# Patient Record
Sex: MALE | Race: WHITE | NOT HISPANIC OR LATINO | Employment: OTHER | URBAN - METROPOLITAN AREA
[De-identification: names, ages, dates, MRNs, and addresses within clinical notes are randomized per-mention and may not be internally consistent; named-entity substitution may affect disease eponyms.]

---

## 2024-06-10 ENCOUNTER — HOSPITAL ENCOUNTER (EMERGENCY)
Facility: CLINIC | Age: 64
Discharge: HOME | End: 2024-06-10
Attending: EMERGENCY MEDICINE | Admitting: EMERGENCY MEDICINE
Payer: COMMERCIAL

## 2024-06-10 ENCOUNTER — APPOINTMENT (OUTPATIENT)
Dept: ULTRASOUND IMAGING | Facility: CLINIC | Age: 64
End: 2024-06-10
Attending: EMERGENCY MEDICINE
Payer: COMMERCIAL

## 2024-06-10 VITALS
TEMPERATURE: 97.1 F | SYSTOLIC BLOOD PRESSURE: 141 MMHG | RESPIRATION RATE: 16 BRPM | DIASTOLIC BLOOD PRESSURE: 96 MMHG | OXYGEN SATURATION: 94 % | WEIGHT: 220 LBS | HEART RATE: 84 BPM

## 2024-06-10 DIAGNOSIS — R82.90 ABNORMAL URINALYSIS: ICD-10-CM

## 2024-06-10 DIAGNOSIS — R93.89 ABNORMAL COMPUTED TOMOGRAPHY ANGIOGRAPHY (CTA): ICD-10-CM

## 2024-06-10 DIAGNOSIS — I26.94 MULTIPLE SUBSEGMENTAL PULMONARY EMBOLI WITHOUT ACUTE COR PULMONALE (H): ICD-10-CM

## 2024-06-10 LAB
ALBUMIN UR-MCNC: 30 MG/DL
ANION GAP SERPL CALCULATED.3IONS-SCNC: 14 MMOL/L (ref 7–15)
APPEARANCE UR: ABNORMAL
BASOPHILS # BLD AUTO: 0.1 10E3/UL (ref 0–0.2)
BASOPHILS NFR BLD AUTO: 1 %
BILIRUB UR QL STRIP: NEGATIVE
BUN SERPL-MCNC: 7.2 MG/DL (ref 8–23)
CALCIUM SERPL-MCNC: 9 MG/DL (ref 8.8–10.2)
CHLORIDE SERPL-SCNC: 101 MMOL/L (ref 98–107)
COLOR UR AUTO: ABNORMAL
CREAT SERPL-MCNC: 0.89 MG/DL (ref 0.67–1.17)
DEPRECATED HCO3 PLAS-SCNC: 25 MMOL/L (ref 22–29)
EGFRCR SERPLBLD CKD-EPI 2021: >90 ML/MIN/1.73M2
EOSINOPHIL # BLD AUTO: 0.2 10E3/UL (ref 0–0.7)
EOSINOPHIL NFR BLD AUTO: 4 %
ERYTHROCYTE [DISTWIDTH] IN BLOOD BY AUTOMATED COUNT: 14.4 % (ref 10–15)
GLUCOSE SERPL-MCNC: 98 MG/DL (ref 70–99)
GLUCOSE UR STRIP-MCNC: NEGATIVE MG/DL
HCT VFR BLD AUTO: 46.1 % (ref 40–53)
HGB BLD-MCNC: 15.7 G/DL (ref 13.3–17.7)
HGB UR QL STRIP: NEGATIVE
HOLD SPECIMEN: NORMAL
IMM GRANULOCYTES # BLD: 0 10E3/UL
IMM GRANULOCYTES NFR BLD: 0 %
KETONES UR STRIP-MCNC: 5 MG/DL
LEUKOCYTE ESTERASE UR QL STRIP: ABNORMAL
LYMPHOCYTES # BLD AUTO: 1.3 10E3/UL (ref 0.8–5.3)
LYMPHOCYTES NFR BLD AUTO: 24 %
MCH RBC QN AUTO: 33.6 PG (ref 26.5–33)
MCHC RBC AUTO-ENTMCNC: 34.1 G/DL (ref 31.5–36.5)
MCV RBC AUTO: 99 FL (ref 78–100)
MONOCYTES # BLD AUTO: 0.6 10E3/UL (ref 0–1.3)
MONOCYTES NFR BLD AUTO: 11 %
MUCOUS THREADS #/AREA URNS LPF: PRESENT /LPF
NEUTROPHILS # BLD AUTO: 3.2 10E3/UL (ref 1.6–8.3)
NEUTROPHILS NFR BLD AUTO: 60 %
NITRATE UR QL: NEGATIVE
NRBC # BLD AUTO: 0 10E3/UL
NRBC BLD AUTO-RTO: 0 /100
NT-PROBNP SERPL-MCNC: 413 PG/ML (ref 0–900)
PH UR STRIP: 5 [PH] (ref 5–7)
PLATELET # BLD AUTO: 248 10E3/UL (ref 150–450)
POTASSIUM SERPL-SCNC: 3.9 MMOL/L (ref 3.4–5.3)
RBC # BLD AUTO: 4.67 10E6/UL (ref 4.4–5.9)
RBC URINE: 2 /HPF
SODIUM SERPL-SCNC: 140 MMOL/L (ref 135–145)
SP GR UR STRIP: 1.02 (ref 1–1.03)
SPERM #/AREA URNS HPF: PRESENT /HPF
SQUAMOUS EPITHELIAL: 4 /HPF
TROPONIN T SERPL HS-MCNC: <6 NG/L
UROBILINOGEN UR STRIP-MCNC: NORMAL MG/DL
WBC # BLD AUTO: 5.3 10E3/UL (ref 4–11)
WBC URINE: 28 /HPF

## 2024-06-10 PROCEDURE — 93005 ELECTROCARDIOGRAM TRACING: CPT | Performed by: EMERGENCY MEDICINE

## 2024-06-10 PROCEDURE — 83880 ASSAY OF NATRIURETIC PEPTIDE: CPT | Performed by: EMERGENCY MEDICINE

## 2024-06-10 PROCEDURE — 36415 COLL VENOUS BLD VENIPUNCTURE: CPT | Performed by: EMERGENCY MEDICINE

## 2024-06-10 PROCEDURE — 93010 ELECTROCARDIOGRAM REPORT: CPT | Performed by: EMERGENCY MEDICINE

## 2024-06-10 PROCEDURE — 82565 ASSAY OF CREATININE: CPT | Performed by: EMERGENCY MEDICINE

## 2024-06-10 PROCEDURE — 84484 ASSAY OF TROPONIN QUANT: CPT | Performed by: EMERGENCY MEDICINE

## 2024-06-10 PROCEDURE — 93970 EXTREMITY STUDY: CPT

## 2024-06-10 PROCEDURE — 99285 EMERGENCY DEPT VISIT HI MDM: CPT | Performed by: EMERGENCY MEDICINE

## 2024-06-10 PROCEDURE — 87186 SC STD MICRODIL/AGAR DIL: CPT | Performed by: EMERGENCY MEDICINE

## 2024-06-10 PROCEDURE — 81001 URINALYSIS AUTO W/SCOPE: CPT | Performed by: EMERGENCY MEDICINE

## 2024-06-10 PROCEDURE — 82374 ASSAY BLOOD CARBON DIOXIDE: CPT | Performed by: EMERGENCY MEDICINE

## 2024-06-10 PROCEDURE — 85041 AUTOMATED RBC COUNT: CPT | Performed by: EMERGENCY MEDICINE

## 2024-06-10 PROCEDURE — 87086 URINE CULTURE/COLONY COUNT: CPT | Performed by: EMERGENCY MEDICINE

## 2024-06-10 PROCEDURE — 99285 EMERGENCY DEPT VISIT HI MDM: CPT | Mod: 25 | Performed by: EMERGENCY MEDICINE

## 2024-06-10 RX ORDER — ALIROCUMAB 150 MG/ML
150 INJECTION, SOLUTION SUBCUTANEOUS
COMMUNITY
Start: 2024-05-16

## 2024-06-10 RX ORDER — CEPHALEXIN 500 MG/1
500 CAPSULE ORAL 2 TIMES DAILY
Qty: 10 CAPSULE | Refills: 0 | Status: SHIPPED | OUTPATIENT
Start: 2024-06-10 | End: 2024-06-12

## 2024-06-10 RX ORDER — BUDESONIDE AND FORMOTEROL FUMARATE DIHYDRATE 160; 4.5 UG/1; UG/1
2 AEROSOL RESPIRATORY (INHALATION) 2 TIMES DAILY
COMMUNITY

## 2024-06-10 RX ORDER — METOPROLOL SUCCINATE 100 MG/1
1 TABLET, EXTENDED RELEASE ORAL DAILY
COMMUNITY
Start: 2024-05-15

## 2024-06-10 RX ORDER — POLYVINYL ALCOHOL 14 MG/ML
1 SOLUTION/ DROPS OPHTHALMIC DAILY PRN
COMMUNITY
Start: 2022-07-18

## 2024-06-10 RX ORDER — PANTOPRAZOLE SODIUM 40 MG/1
40 TABLET, DELAYED RELEASE ORAL
COMMUNITY

## 2024-06-10 RX ORDER — ALBUTEROL SULFATE 90 UG/1
2 AEROSOL, METERED RESPIRATORY (INHALATION) EVERY 6 HOURS PRN
COMMUNITY

## 2024-06-10 ASSESSMENT — ACTIVITIES OF DAILY LIVING (ADL)
ADLS_ACUITY_SCORE: 35

## 2024-06-10 ASSESSMENT — ENCOUNTER SYMPTOMS
MUSCULOSKELETAL NEGATIVE: 1
HEMATOLOGIC/LYMPHATIC NEGATIVE: 1
ENDOCRINE NEGATIVE: 1
GASTROINTESTINAL NEGATIVE: 1
CHEST TIGHTNESS: 0
PSYCHIATRIC NEGATIVE: 1
EYES NEGATIVE: 1
CONSTITUTIONAL NEGATIVE: 1
SHORTNESS OF BREATH: 1
NEUROLOGICAL NEGATIVE: 1
ALLERGIC/IMMUNOLOGIC NEGATIVE: 1

## 2024-06-10 ASSESSMENT — COLUMBIA-SUICIDE SEVERITY RATING SCALE - C-SSRS
2. HAVE YOU ACTUALLY HAD ANY THOUGHTS OF KILLING YOURSELF IN THE PAST MONTH?: NO
6. HAVE YOU EVER DONE ANYTHING, STARTED TO DO ANYTHING, OR PREPARED TO DO ANYTHING TO END YOUR LIFE?: NO
1. IN THE PAST MONTH, HAVE YOU WISHED YOU WERE DEAD OR WISHED YOU COULD GO TO SLEEP AND NOT WAKE UP?: NO

## 2024-06-10 NOTE — DISCHARGE INSTRUCTIONS
1) Your evaluation today based on your history and report and reviewing CT imaging that was completed 3 days earlier.  You are currently starting on a blood thinner which is anticoagulation based on CT interpretation ordered by her treating cardiologist-days ago that revealed nonocclusive pulmonary emboli bilaterally in the right upper lobe and  right lower lobe and in the left lower lobe.  We discussed risk and benefit of anticoagulation including bleeding.  I started you on a starter pack which is Xarelto to take 1 tablet twice a day for the next 21 days and then 1 tablet daily for the next 3 months with further medication adjustment to be done by your treating provider including your- Dr.S Larkin-treating cardiologist who ordered your CT imaging and your primary care provider- Dr. LINDA Dickens.    2) During visit urinalysis was obtained which appears to be concerning for infection with moderate leukocyte esterase and 20 white cells.  He reported no true urinary symptoms although urine appeared cloudy.  To ensure his symptoms are due to an infection urine culture is pending he was started empirically on antibiotics pending urine culture results.  We discussed that if there is a need to change treatment plan based on urine culture results will be notified by the ED follow-up nurses.  We have also discussed the importance of reaching out to her care team upon return to [facility] however you should call them while you are here in [place]

## 2024-06-10 NOTE — MEDICATION SCRIBE - ADMISSION MEDICATION HISTORY
Medication Scribe Admission Medication History    Admission medication history is complete. The information provided in this note is only as accurate as the sources available at the time of the update.    Information Source(s): Patient and CareEverywhere/SureScripts via  with patient in room and finished at desk .    Pertinent Information: He has his medicines with him today.  All medicines on PTA med list are new to the list as patient is from [place].    Changes made to PTA medication list:  Added: All medicines  Deleted: None  Changed: None    Allergies reviewed with patient and updates made in EHR: yes.    Medication History Completed By: Hayley Robledo 6/10/2024 12:54 PM    PTA Med List   Medication Sig Last Dose    albuterol (PROAIR HFA/PROVENTIL HFA/VENTOLIN HFA) 108 (90 Base) MCG/ACT inhaler Inhale 2 puffs into the lungs every 6 hours as needed for shortness of breath More than a month at prn    budesonide-formoterol (SYMBICORT) 160-4.5 MCG/ACT Inhaler Inhale 2 puffs into the lungs 2 times daily 6/10/2024 at am has with today    metoprolol succinate ER (TOPROL XL) 100 MG 24 hr tablet Take 1 tablet by mouth daily 6/9/2024 at am    pantoprazole (PROTONIX) 40 MG EC tablet 40 mg 2 times daily (before meals) 6/9/2024 at pm    polyvinyl alcohol (LIQUIFILM TEARS) 1.4 % ophthalmic solution Apply 1 drop to eye daily as needed for dry eyes Past Month at prn    PRALUENT 150 MG/ML injectable pen Inject 150 mg Subcutaneous every 14 days 6/1/2024 at am

## 2024-06-10 NOTE — ED PROVIDER NOTES
"  History     Chief Complaint   Patient presents with    Shortness of Breath     HPI  Epifanio Ward is a 63 year old male who presents for evaluation with report of shortness of breath.  On intake patient reported that she had imaging done on 5/31/24-reviewed that she had \"blood clots.  She was also noted to have an aneurysm.  She reported that she has had more lower extremity swelling since last night and is felt more short of breath since her diagnosis and imaging.    Review of the record.  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  Incompletely occlusive pulmonary emboli was seen within the few pulmonary arterial branches and right upper right lower and left lower pulmonary lobes.  He also at that time felt this was likely acute.  There is no central pulmonary embolism.  See detailed in the medical record and the radiology report      On examination patient arrived by car with his wife Ramona from home in [place], [place].  Patient reports he is visiting his brother who lives in [place] and is here till Father's Day.  He reports he had presented for dyspnea at his local care facility while in [place] and had imaging done which confirmed the findings of nonocclusive pulmonary embolism.  He reports he had received the results but was not able to start therapies because by his report the provider on-call was not able to initiate therapy is required.  He reports no prior history of DVT or PE.  He had some chest tightness and shortness of breath.  Patient's current prescribed medications include Symbicort, pantoprazole 40 mg daily, rosuvastatin, metoprolol 100 mg daily and reports he started on wegovy in the last 2 weeks.   Reports that 60s imaging was obtained due to shortness of breath and fusiform aneurysm noted by his treating cardiologist- Dr. ARELI Larkin and that he is followed by Dr. Chavez Dickens.      Allergies:  Allergies   Allergen " Reactions    Hydrochlorothiazide Other (See Comments)     stroke    Lisinopril Other (See Comments)     shakiness       Problem List:    There are no problems to display for this patient.       Past Medical History:    No past medical history on file.    Past Surgical History:    No past surgical history on file.    Family History:    No family history on file.    Social History:  Marital Status:   [2]        Medications:    albuterol (PROAIR HFA/PROVENTIL HFA/VENTOLIN HFA) 108 (90 Base) MCG/ACT inhaler  budesonide-formoterol (SYMBICORT) 160-4.5 MCG/ACT Inhaler  cephALEXin (KEFLEX) 500 MG capsule  metoprolol succinate ER (TOPROL XL) 100 MG 24 hr tablet  pantoprazole (PROTONIX) 40 MG EC tablet  polyvinyl alcohol (LIQUIFILM TEARS) 1.4 % ophthalmic solution  PRALUENT 150 MG/ML injectable pen  Rivaroxaban ANTICOAGULANT 15 & 20 MG TBPK Starter Therapy Pack          Review of Systems   Constitutional: Negative.    HENT: Negative.     Eyes: Negative.    Respiratory:  Positive for shortness of breath. Negative for chest tightness.    Cardiovascular:  Positive for chest pain.   Gastrointestinal: Negative.    Endocrine: Negative.    Genitourinary: Negative.    Musculoskeletal: Negative.    Skin: Negative.    Allergic/Immunologic: Negative.    Neurological: Negative.    Hematological: Negative.    Psychiatric/Behavioral: Negative.     All other systems reviewed and are negative.      Physical Exam   BP: (!) 143/89  Pulse: 66  Temp: 97.1  F (36.2  C)  Resp: 16  Weight: 99.8 kg (220 lb)  SpO2: 98 %      Physical Exam    ED Course        Procedures              EKG Interpretation:      Interpreted by Chris Covington MD  Time reviewed: 1230  Symptoms at time of EKG: None   Rhythm: normal sinus   Rate: Normal  Axis: Normal  Ectopy: none  Conduction: normal  ST Segments/ T Waves: T wave inversion in V2 and depression in V3 through V6.  Q Waves: nonspecific  Comparison to prior: No old viewable EKG in the medical record  for comparison    Clinical Impression: ST inversion with depression in the precordial leads V3 through V6.        Critical Care time:  was 30 minutes for this patient excluding procedures.             ED medications: none      ED Vitals:  Vitals:    06/10/24 1200 06/10/24 1328 06/10/24 1401 06/10/24 1430   BP: 139/72 125/74 129/81 (!) 141/96   Pulse: 60  55 84   Resp:       Temp:       TempSrc:       SpO2:  93% 93% 94%   Weight:          ED labs and imaging:  Results for orders placed or performed during the hospital encounter of 06/10/24 (from the past 24 hour(s))   [place] Draw    Narrative    The following orders were created for panel order [place] Draw.  Procedure                               Abnormality         Status                     ---------                               -----------         ------                     Extra Blue Top Tube[[id number]]                              Final result               Extra Green Top (Lithium...[[id number]]                      Final result               Extra Purple Top Tube[[id number]]                            Final result                 Please view results for these tests on the individual orders.   Extra Blue Top Tube   Result Value Ref Range    Hold Specimen JIC    Extra Green Top (Lithium Heparin) Tube   Result Value Ref Range    Hold Specimen JIC    Extra Purple Top Tube   Result Value Ref Range    Hold Specimen JIC    CBC with platelets differential    Narrative    The following orders were created for panel order CBC with platelets differential.  Procedure                               Abnormality         Status                     ---------                               -----------         ------                     CBC with platelets and d...[[id number]]  Abnormal            Final result                 Please view results for these tests on the individual orders.   Basic metabolic panel   Result Value Ref Range    Sodium 140 135 - 145 mmol/L    Potassium  3.9 3.4 - 5.3 mmol/L    Chloride 101 98 - 107 mmol/L    Carbon Dioxide (CO2) 25 22 - 29 mmol/L    Anion Gap 14 7 - 15 mmol/L    Urea Nitrogen 7.2 (L) 8.0 - 23.0 mg/dL    Creatinine 0.89 0.67 - 1.17 mg/dL    GFR Estimate >90 >60 mL/min/1.73m2    Calcium 9.0 8.8 - 10.2 mg/dL    Glucose 98 70 - 99 mg/dL   Troponin T, High Sensitivity   Result Value Ref Range    Troponin T, High Sensitivity <6 <=22 ng/L   Nt probnp inpatient (BNP)   Result Value Ref Range    N terminal Pro BNP Inpatient 413 0 - 900 pg/mL   CBC with platelets and differential   Result Value Ref Range    WBC Count 5.3 4.0 - 11.0 10e3/uL    RBC Count 4.67 4.40 - 5.90 10e6/uL    Hemoglobin 15.7 13.3 - 17.7 g/dL    Hematocrit 46.1 40.0 - 53.0 %    MCV 99 78 - 100 fL    MCH 33.6 (H) 26.5 - 33.0 pg    MCHC 34.1 31.5 - 36.5 g/dL    RDW 14.4 10.0 - 15.0 %    Platelet Count 248 150 - 450 10e3/uL    % Neutrophils 60 %    % Lymphocytes 24 %    % Monocytes 11 %    % Eosinophils 4 %    % Basophils 1 %    % Immature Granulocytes 0 %    NRBCs per 100 WBC 0 <1 /100    Absolute Neutrophils 3.2 1.6 - 8.3 10e3/uL    Absolute Lymphocytes 1.3 0.8 - 5.3 10e3/uL    Absolute Monocytes 0.6 0.0 - 1.3 10e3/uL    Absolute Eosinophils 0.2 0.0 - 0.7 10e3/uL    Absolute Basophils 0.1 0.0 - 0.2 10e3/uL    Absolute Immature Granulocytes 0.0 <=0.4 10e3/uL    Absolute NRBCs 0.0 10e3/uL   US Lower Extremity Venous Duplex Bilateral    Narrative    US LOWER EXTREMITY VENOUS DUPLEX BILATERAL  PROCEDURE DATE: 6/10/2024 12:37 PM     HISTORY:  Recent diagnosis of nonocclusive bilateral pulmonary emboli  on 6/7/24.  Evaluate for lower extremity DVT    COMPARISON: None.    TECHNIQUE:   Grayscale, color-flow, and spectral waveform analysis were performed  of the deep veins of the bilateral lower extremities    FINDINGS:   The right common femoral vein, femoral vein, popliteal vein and tibial  veins demonstrate normal compressibility, spectral waveform, color  flow and augmentation.    The left  common femoral vein, femoral vein, popliteal vein and tibial  veins demonstrate normal compressibility, spectral waveform, color  flow and augmentation.      Impression    IMPRESSION:   1. No evidence of deep venous thrombosis in the right lower extremity.  2. No evidence of deep venous thrombosis in the left lower extremity.    JORDAN CESPEDES MD         SYSTEM ID:  [id number]   UA with Microscopic reflex to Culture    Specimen: Urine, Clean Catch   Result Value Ref Range    Color Urine Gisele (A) Colorless, Straw, Light Yellow, Yellow    Appearance Urine Slightly Cloudy (A) Clear    Glucose Urine Negative Negative mg/dL    Bilirubin Urine Negative Negative    Ketones Urine 5 (A) Negative mg/dL    Specific Gravity Urine 1.023 1.003 - 1.035    Blood Urine Negative Negative    pH Urine 5.0 5.0 - 7.0    Protein Albumin Urine 30 (A) Negative mg/dL    Urobilinogen Urine Normal Normal, 2.0 mg/dL    Nitrite Urine Negative Negative    Leukocyte Esterase Urine Moderate (A) Negative    Mucus Urine Present (A) None Seen /LPF    Sperm Urine Present (A) None Seen /HPF    RBC Urine 2 <=2 /HPF    WBC Urine 28 (H) <=5 /HPF    Squamous Epithelials Urine 4 (H) <=1 /HPF    Narrative    Urine Culture ordered based on laboratory criteria           Assessments & Plan (with Medical Decision Making)   Assessment Summary and clinical Impression: 63-year-old male who presented with concern about shortness of breath, and bilateral lower foot swelling since last night.  Patient was also diagnosed with nonocclusive pulmonary emboli involving the right upper and lower lobe and left lower lobe.  He was also found to have a mild fusiform aneurysmal dilatation of the  ascending thoracic aorta on 5/31/24.  On arrival patient was afebrile.  Blood pressure was 140/87.  He was 98% on room air.  Arrived by car from home with his wife Ramona reporting that he recently drove from [facility] to visit friends and family.  He is in town for another days.  He  reports he had imaging done after presenting with dyspnea and was found to have pulmonary embolism.  He has not started treatment because he could not reach his ordering provider.  No prior history of VTE.  Presented because he had subacute dyspnea.  On arrival given absence of hemodynamic instability no respiratory distress further workup was completed to ensure his pulmonary embolism was not from an undiagnosed DVT.  Ultrasound reviewed.  Patient and spouse and I had a discussion about repeat CT imaging after confirming that his initial CTA was done on 5/31/24.  It appears that they were only contacted about the results on 6/7/24. We agreed that there was no indication for repeat CTA or CT chest imaging with contrast as my suspicion that his clot burden had worsened was low although not definitively excluded without follow-up imaging that the likely that follow-up imaging or change treatment plan would be low given  normal cardiac biomarkers and patient appearing well with no respiratory distress or oxygen needs. We discussed empiric anticoagulation based on radiology interpretation available from study from 5/31/24.  patient was started on Xarelto with plan for further medication management needs to be coordinated with his care team including his cardiologist who reported ordered the imaging study and his primary care provider for duration of therapy including whether further workup  will be required given the etiology of his nonocclusive pulm embolism is not clear.  Patient and spouse report they are staying locally in [place] with family until 6/16/24-there are new concerns including fever, fainting, increasing dyspnea on exertion or chest pain at rest or with activity should return to be reevaluated,    ED course and plan:  Reviewed the medical record.  Reviewed office visit on 5/31/2024. Reviewed radiology report and imaging findings outlined in medical record dated 6/7/24.  Complains of bilateral lower  extremity ultrasounds obtained to assess for the presence of DVT. EKG revealed T wave inversion with depression in precordial leads V2 through V6.  There is no old viewable EKG for comparison. Troponin and BNP were both normal.  Lower extremity ultrasound did not show any evidence for DVT.  Based on radiology interpretation from diagnostic imaging completed at the outside we reviewed empiric treatment as an outpatient given absence of hemodynamic instability and no respiratory distress.  We also reviewed the modified Hestia criteria and PESI score. We reviewed anticoagulation with DOAC's risk and benefit.  Patient was started on Xarelto with a starter pack and plan to treat for likely 3 to 6 months.  Urinalysis today was concerning for infection with moderate leukocyte esterase, and 20 white cells.  Negative nitrites slightly cloudy in color.  Reviewed empiric treatment with cephalexin pending urine culture results. We discussed if there is a need for change in treatment plan based on urine culture results she will be notified by the ED follow-up nurses.  I recommended that he reach out to his care team including his treating cardiologist and primary care provider to update them on new therapeutics include anticoagulation with Xarelto and cephalexin including coordination of follow-up referral including possibility of  further workup due to PE of unclear etiology with reassuring lower extremity ultrasound exam today.  I had a long discussion with the patient and spouse about the reason and rationale for  not repeating a CT imaging  today given low suspicion for central or large burden pulmonary embolism given no respiratory distress  and normal cardiac biomarkers.  Patient also had no oxygen needs and expressed comfort with follow-up with care team in [place] with low threshold to return if there are new concerns.      Disclaimer: This note consists of symbols derived from keyboarding, dictation and/or voice  recognition software. As a result, there may be errors in the script that have gone undetected. Please consider this when interpreting information found in this chart.   I have reviewed the nursing notes.    I have reviewed the findings, diagnosis, plan and need for follow up with the patient.           Medical Decision Making  The patient's presentation was of high complexity (chest pain shortness of breath recent diagnosis of nonocclusive right lower and upper lobe pulmonary embolism and left lower lobe pulmonary embolism).    The patient's evaluation involved:  ordering and/or review of 2 test(s) in this encounter (diagnostic imaging and lab)    The patient's management necessitated high risk (medication management discussion,).        New Prescriptions    CEPHALEXIN (KEFLEX) 500 MG CAPSULE    Take 1 capsule (500 mg) by mouth 2 times daily for 5 days    RIVAROXABAN ANTICOAGULANT 15 & 20 MG TBPK STARTER THERAPY PACK    Take 15 mg by mouth 2 times daily (with meals) for 21 days, THEN 20 mg daily with food for 9 days.       Final diagnoses:   Multiple subsegmental pulmonary emboli without acute cor pulmonale (H)   Abnormal computed tomography angiography (CTA) - Impression  1.  Mild fusiform aneurysmal dilatation of the ascending thoracic aorta is again noted, not significantly changed from the previous exam. 2.  There are small incompletely occlusive pulmonary emboli seen within a few pulmonary arterial branches of the right upper, right lower and left lower pulmonary lobes. These are new from the previous exam, likely acute. There is no large central pulmonary embolism.  The orange significant findings protocol was initiated on 6/7/2024 6:47 PM. The presence of a significant findings result is to be communicated with a clinician and/or clinical staff by support staff.  Electronically signed by: Ignacio Mann MD on 6/7/2024 6:48 PM.   Abnormal urinalysis - [place] leukocyte Estrace and 20 white cells per  high-power field       6/10/2024   [facility] EMERGENCY DEPT       Chris Covington MD  06/10/24 5484

## 2024-06-10 NOTE — ED TRIAGE NOTES
Triage Assessment (Adult)       Row Name 06/10/24 1027          Triage Assessment    Airway WDL WDL        Respiratory WDL    Respiratory WDL X  shortness of breath        Skin Circulation/Temperature WDL    Skin Circulation/Temperature WDL WDL        Cardiac WDL    Cardiac WDL WDL        Peripheral/Neurovascular WDL    Peripheral Neurovascular WDL WDL        Cognitive/Neuro/Behavioral WDL    Cognitive/Neuro/Behavioral WDL WDL

## 2024-06-10 NOTE — ED TRIAGE NOTES
Pt had a ct done 2 weeks ago on 5/31 and blood clots were found, pt was informed of this on Saturday. Pt has been having shortness of breath for a few weeks. Pt has an aneurism. Pt denies chest breath. Pt states his feet were swollen last night but now are now     Triage Assessment (Adult)       Row Name 06/10/24 1027          Triage Assessment    Airway WDL WDL        Respiratory WDL    Respiratory WDL X  shortness of breath        Skin Circulation/Temperature WDL    Skin Circulation/Temperature WDL WDL        Cardiac WDL    Cardiac WDL WDL        Peripheral/Neurovascular WDL    Peripheral Neurovascular WDL WDL        Cognitive/Neuro/Behavioral WDL    Cognitive/Neuro/Behavioral WDL WDL

## 2024-06-10 NOTE — ED TRIAGE NOTES
Pt had a ct done 2 weeks ago on 5/31 and blood clots were found, pt was informed of this on Saturday. Pt has been having shortness of breath for a few weeks. Pt has an aneurism. Pt denies chest breath. Pt states his feet were swollen last night but now are not.

## 2024-06-12 ENCOUNTER — TELEPHONE (OUTPATIENT)
Dept: NURSING | Facility: CLINIC | Age: 64
End: 2024-06-12
Payer: COMMERCIAL

## 2024-06-12 DIAGNOSIS — N39.0 URINARY TRACT INFECTION: Primary | ICD-10-CM

## 2024-06-12 LAB — BACTERIA UR CULT: ABNORMAL

## 2024-06-12 RX ORDER — NITROFURANTOIN 25; 75 MG/1; MG/1
100 CAPSULE ORAL 2 TIMES DAILY
Qty: 10 CAPSULE | Refills: 0 | Status: SHIPPED | OUTPATIENT
Start: 2024-06-12 | End: 2024-06-17

## 2024-06-12 NOTE — TELEPHONE ENCOUNTER
[facility]    Reason for call: Lab Result Notification     Lab Result (including Rx patient on, if applicable).  If culture, copy of lab report at bottom.  Lab Result: Urine Culture -see below    ED Rx:  cephALEXin (KEFLEX) 500 MG capsule - Take 1 capsule (500 mg) by mouth 2 times daily for 5 days - RESISTANT    Creatinine Level (mg/dl)   Creatinine   Date Value Ref Range Status   06/10/2024 0.89 0.67 - 1.17 mg/dL Final    Creatinine clearance (ml/min), if applicable    Creatinine clearance cannot be calculated (Unknown ideal weight.)     Patient's current Symptoms:   Overall improved - some increased frequency.     RN Recommendations/Instructions per [place] ED lab result protocol:   [facility] ED lab result protocol utilized: Urine Culture - final positive  Advise to discontinue current antibiotic.   Instruct to start antibiotic, sent to preferred pharmacy.   Start Macrobid 100mg BID x 5 days    Patient/care giver notified to contact your PCP clinic or return to the Emergency department if your:  Symptoms return.  Symptoms do not improve after 3 days on antibiotic.  Symptoms do not resolve after completing antibiotic.  Symptoms worsen or other concerning symptoms.      Maddie Medrano RN

## 2024-06-12 NOTE — TELEPHONE ENCOUNTER
[facility]    Reason for call: Lab Result Notification     Lab Result (including Rx patient on, if applicable).  If culture, copy of lab report at bottom.  Lab Result: See below  cephALEXin (KEFLEX) 500 MG capsule -  Take 1 capsule (500 mg) by mouth 2 times daily for 5 days RESISTANT  Creatinine Level (mg/dl)   Creatinine   Date Value Ref Range Status   06/10/2024 0.89 0.67 - 1.17 mg/dL Final    Creatinine clearance (ml/min), if applicable    Creatinine clearance cannot be calculated (Unknown ideal weight.)     RN Recommendations/Instructions per [place] ED lab result protocol:   [facility] ED lab result protocol utilized: urine culture    Possibly Macrobid.    Unable to reach patient/caregiver.   Left voicemail message requesting a call back to [phone] between 9 a.m. and 5:30 p.m. for patient's ED/UC lab results. Will send a letter if no return call by end of day.         Mundo Gamboa RN

## 2024-06-12 NOTE — LETTER
June 12, 2024        Epifanio Ward  [address]  [place] [address]          Dear Epifanio Ward:    You were seen in the [facility] Emergency Department at [facility] on 6/12/2024.  We are unable to reach you by phone, so we are sending you this letter.     It is important that you call [facility] Emergency Department lab result nurse at [phone], as we have information to relay to you AND/OR we MAY have to make some changes in your treatment.    Best time to call back is between 9AM and 5:30PM, 7 days a week.      Sincerely,     [facility] Emergency Department Lab Result RN  [phone]